# Patient Record
Sex: MALE | URBAN - METROPOLITAN AREA
[De-identification: names, ages, dates, MRNs, and addresses within clinical notes are randomized per-mention and may not be internally consistent; named-entity substitution may affect disease eponyms.]

---

## 2021-12-23 ENCOUNTER — OFFICE VISIT (OUTPATIENT)
Dept: URBAN - METROPOLITAN AREA CLINIC 82 | Facility: CLINIC | Age: 21
End: 2021-12-23
Payer: COMMERCIAL

## 2021-12-23 DIAGNOSIS — H52.13 MYOPIA, BILATERAL: Primary | ICD-10-CM

## 2021-12-23 PROCEDURE — 92004 COMPRE OPH EXAM NEW PT 1/>: CPT | Performed by: OPTOMETRIST

## 2021-12-23 ASSESSMENT — KERATOMETRY
OS: 44.38
OD: 44.38

## 2021-12-23 ASSESSMENT — VISUAL ACUITY
OS: 20/15
OD: 20/15

## 2021-12-23 ASSESSMENT — INTRAOCULAR PRESSURE
OS: 11
OD: 12

## 2021-12-23 NOTE — IMPRESSION/PLAN
Impression: Myopia, bilateral: H52.13. Plan: Educated pt on exam findings. Updated and finalized SRx. Educated pt on 1-2 week adaptation period with updated SRx. Educated pt on proper CL hygiene, replacement of lenses, avoiding sleeping and swimming in lenses. Educated pt discontinue CL wear if eyes become painful, red, or irritated and RTC ASAP for evaluation. Pt expressed understanding. Acuvue Oasys Finalized CLRx today. RTC 1 year for CE w/CLs, or PRN.